# Patient Record
Sex: MALE | Race: WHITE | Employment: FULL TIME | URBAN - METROPOLITAN AREA
[De-identification: names, ages, dates, MRNs, and addresses within clinical notes are randomized per-mention and may not be internally consistent; named-entity substitution may affect disease eponyms.]

---

## 2024-04-10 ENCOUNTER — NURSE ONLY (OUTPATIENT)
Age: 39
End: 2024-04-10

## 2024-04-10 DIAGNOSIS — Z00.00 BLOOD TESTS FOR ROUTINE GENERAL PHYSICAL EXAMINATION: Primary | ICD-10-CM

## 2024-04-11 LAB
ALBUMIN SERPL-MCNC: 4.6 G/DL (ref 4.1–5.1)
ALBUMIN/GLOB SERPL: 2 {RATIO} (ref 1.2–2.2)
ALP SERPL-CCNC: 100 IU/L (ref 44–121)
ALT SERPL-CCNC: 39 IU/L (ref 0–44)
AST SERPL-CCNC: 28 IU/L (ref 0–40)
BASOPHILS # BLD AUTO: 0.1 X10E3/UL (ref 0–0.2)
BASOPHILS NFR BLD AUTO: 1 %
BILIRUB SERPL-MCNC: 0.2 MG/DL (ref 0–1.2)
BUN SERPL-MCNC: 20 MG/DL (ref 6–24)
BUN/CREAT SERPL: 20 (ref 9–20)
CALCIUM SERPL-MCNC: 9.5 MG/DL (ref 8.7–10.2)
CHLORIDE SERPL-SCNC: 105 MMOL/L (ref 96–106)
CHOLEST SERPL-MCNC: 196 MG/DL (ref 100–199)
CO2 SERPL-SCNC: 20 MMOL/L (ref 20–29)
CREAT SERPL-MCNC: 1.02 MG/DL (ref 0.76–1.27)
EGFRCR SERPLBLD CKD-EPI 2021: 95 ML/MIN/1.73
EOSINOPHIL # BLD AUTO: 0.1 X10E3/UL (ref 0–0.4)
EOSINOPHIL NFR BLD AUTO: 1 %
ERYTHROCYTE [DISTWIDTH] IN BLOOD BY AUTOMATED COUNT: 12.1 % (ref 11.6–15.4)
GLOBULIN SER CALC-MCNC: 2.3 G/DL (ref 1.5–4.5)
GLUCOSE SERPL-MCNC: 96 MG/DL (ref 70–99)
HBA1C MFR BLD: 5.5 % (ref 4.8–5.6)
HCT VFR BLD AUTO: 45.4 % (ref 37.5–51)
HDLC SERPL-MCNC: 60 MG/DL
HGB BLD-MCNC: 14.8 G/DL (ref 13–17.7)
IMM GRANULOCYTES # BLD AUTO: 0.1 X10E3/UL (ref 0–0.1)
IMM GRANULOCYTES NFR BLD AUTO: 1 %
LDLC SERPL CALC-MCNC: 124 MG/DL (ref 0–99)
LYMPHOCYTES # BLD AUTO: 2.5 X10E3/UL (ref 0.7–3.1)
LYMPHOCYTES NFR BLD AUTO: 27 %
MCH RBC QN AUTO: 29.5 PG (ref 26.6–33)
MCHC RBC AUTO-ENTMCNC: 32.6 G/DL (ref 31.5–35.7)
MCV RBC AUTO: 91 FL (ref 79–97)
MONOCYTES # BLD AUTO: 0.7 X10E3/UL (ref 0.1–0.9)
MONOCYTES NFR BLD AUTO: 7 %
NEUTROPHILS # BLD AUTO: 5.9 X10E3/UL (ref 1.4–7)
NEUTROPHILS NFR BLD AUTO: 63 %
PLATELET # BLD AUTO: 276 X10E3/UL (ref 150–450)
POTASSIUM SERPL-SCNC: 4.7 MMOL/L (ref 3.5–5.2)
PROT SERPL-MCNC: 6.9 G/DL (ref 6–8.5)
PSA SERPL-MCNC: 0.7 NG/ML (ref 0–4)
RBC # BLD AUTO: 5.01 X10E6/UL (ref 4.14–5.8)
SODIUM SERPL-SCNC: 140 MMOL/L (ref 134–144)
TRIGL SERPL-MCNC: 68 MG/DL (ref 0–149)
TSH SERPL DL<=0.005 MIU/L-ACNC: 1.86 UIU/ML (ref 0.45–4.5)
VLDLC SERPL CALC-MCNC: 12 MG/DL (ref 5–40)
WBC # BLD AUTO: 9.4 X10E3/UL (ref 3.4–10.8)

## 2024-04-14 LAB
TESTOST FREE SERPL-MCNC: 8.7 PG/ML (ref 6.8–21.5)
TESTOST SERPL-MCNC: 339 NG/DL (ref 264–916)

## 2024-04-17 ENCOUNTER — OFFICE VISIT (OUTPATIENT)
Age: 39
End: 2024-04-17

## 2024-04-17 VITALS
DIASTOLIC BLOOD PRESSURE: 82 MMHG | SYSTOLIC BLOOD PRESSURE: 118 MMHG | HEART RATE: 84 BPM | RESPIRATION RATE: 19 BRPM | OXYGEN SATURATION: 94 % | WEIGHT: 205.3 LBS | HEIGHT: 68 IN | TEMPERATURE: 98 F | BODY MASS INDEX: 31.11 KG/M2

## 2024-04-17 DIAGNOSIS — Z00.00 ROUTINE CHECK-UP: Primary | ICD-10-CM

## 2024-04-17 ASSESSMENT — PATIENT HEALTH QUESTIONNAIRE - PHQ9
SUM OF ALL RESPONSES TO PHQ QUESTIONS 1-9: 0
2. FEELING DOWN, DEPRESSED OR HOPELESS: NOT AT ALL
SUM OF ALL RESPONSES TO PHQ9 QUESTIONS 1 & 2: 0
SUM OF ALL RESPONSES TO PHQ QUESTIONS 1-9: 0

## 2024-04-17 NOTE — PROGRESS NOTES
2024    Mynor Carlos (:  1985) is a 39 y.o. male, here for a preventive medicine evaluation.    Subjective   There is no problem list on file for this patient.      Physical was declined at this time.    Vitals:    24 0705   BP: 118/82   Site: Right Upper Arm   Position: Sitting   Cuff Size: Medium Adult   Pulse: 84   Resp: 19   Temp: 98 °F (36.7 °C)   TempSrc: Oral   SpO2: 94%   Weight: 93.1 kg (205 lb 4.8 oz)   Height: 1.734 m (5' 8.25\")     Estimated body mass index is 30.99 kg/m² as calculated from the following:    Height as of this encounter: 1.734 m (5' 8.25\").    Weight as of this encounter: 93.1 kg (205 lb 4.8 oz).       Objective           Latest Ref Rng & Units 4/10/2024     7:18 AM   LAB PRIMARY CARE   A1C 4.8 - 5.6 % 5.5    A1C POC 4.8 - 5.6 % 5.5    GLU random 70 - 99 mg/dL 96    CHOL 100 - 199 mg/dL 196    TRIG 0 - 149 mg/dL 68    HDL >39 mg/dL 60    LDL CALC 0 - 99 mg/dL 124     - 144 mmol/L 140    K 3.5 - 5.2 mmol/L 4.7    BUN 6 - 24 mg/dL 20    CR 0.76 - 1.27 mg/dL 1.02    CA 8.7 - 10.2 mg/dL 9.5    ALT 0 - 44 IU/L 39    AST 0 - 40 IU/L 28    TSH 0.450 - 4.500 uIU/mL 1.860    PSA 0.0 - 4.0 ng/mL 0.7    HGB 13.0 - 17.7 g/dL 14.8        Lab Results   Component Value Date/Time    CHOL 196 04/10/2024 07:18 AM    TRIG 68 04/10/2024 07:18 AM    HDL 60 04/10/2024 07:18 AM    LDLCALC 124 04/10/2024 07:18 AM    GLUCOSE 96 04/10/2024 07:18 AM    LABA1C 5.5 04/10/2024 07:18 AM       The 10-year ASCVD risk score (Ivelisse KNAPP, et al., 2019) is: 0.8%    Values used to calculate the score:      Age: 39 years      Sex: Male      Is Non- : No      Diabetic: No      Tobacco smoker: No      Systolic Blood Pressure: 118 mmHg      Is BP treated: No      HDL Cholesterol: 60 mg/dL      Total Cholesterol: 196 mg/dL      There is no immunization history on file for this patient.    Health Maintenance   Topic Date Due    Hepatitis B vaccine (1 of 3 - 3-dose series)

## 2025-03-31 ENCOUNTER — OFFICE VISIT (OUTPATIENT)
Age: 40
End: 2025-03-31

## 2025-03-31 VITALS
BODY MASS INDEX: 31.07 KG/M2 | WEIGHT: 205 LBS | SYSTOLIC BLOOD PRESSURE: 118 MMHG | HEIGHT: 68 IN | RESPIRATION RATE: 16 BRPM | HEART RATE: 79 BPM | TEMPERATURE: 98 F | DIASTOLIC BLOOD PRESSURE: 86 MMHG | OXYGEN SATURATION: 98 %

## 2025-03-31 DIAGNOSIS — Z75.8 DOES NOT HAVE PRIMARY CARE PROVIDER: ICD-10-CM

## 2025-03-31 DIAGNOSIS — M77.9 TENDONITIS: Primary | ICD-10-CM

## 2025-03-31 NOTE — PROGRESS NOTES
PROGRESS NOTE    SUBJECTIVE:   Mynor Carlos is a 40 y.o. male seen in the [email or web address] for left arm pain. The pain started 2 weeks ago on a Thursday. The patient notes that he was using his left arm to pull toward him the object to support it while using a drill with the right arm. The patient is right hand dominant. He has also been assisting his father build a deck and notes that pain is increased after repetitive activity. Cough intensifies the pain when is is currently present, denies chest pain. Pain with activity is 6-7/10, while at rest pain is 0. He feels that the pain is affecting his  with decreased strength to the left hand. He has been taking ibuprofen which alleviates the pain. He state's that he is concerned that the \"pain is a cardiac event\". No family history of cardiac event or death under the age of 50. The patient does not currently have a PCP, would like to establish care in the future.     Chief Complaint    Pain           Muscle Pain  This is a new problem. The current episode started 1 to 4 weeks ago (2 weeks ago). The problem occurs daily. The problem has been waxing and waning since onset. Associated with: Recent repetitive activity at work, assisting father build a deck. The pain is present in the left arm and left elbow. The pain is mild. Exacerbated by: certain movements. Pertinent negatives include no chest pain, joint swelling, stiffness, shortness of breath or weakness. Past treatments include OTC NSAID. The treatment provided moderate relief. There is no swelling present.       No current outpatient medications on file.     No current facility-administered medications for this visit.      Allergies   Allergen Reactions    Seasonal        Social History     Tobacco Use    Smoking status: Never    Smokeless tobacco: Never        Review of Systems   Constitutional: Negative.    Respiratory:  Negative for shortness of breath.

## 2025-04-01 ASSESSMENT — ENCOUNTER SYMPTOMS: SHORTNESS OF BREATH: 0

## 2025-05-04 SDOH — HEALTH STABILITY: PHYSICAL HEALTH: ON AVERAGE, HOW MANY MINUTES DO YOU ENGAGE IN EXERCISE AT THIS LEVEL?: 60 MIN

## 2025-05-04 SDOH — HEALTH STABILITY: PHYSICAL HEALTH: ON AVERAGE, HOW MANY DAYS PER WEEK DO YOU ENGAGE IN MODERATE TO STRENUOUS EXERCISE (LIKE A BRISK WALK)?: 3 DAYS

## 2025-05-07 ENCOUNTER — OFFICE VISIT (OUTPATIENT)
Dept: PRIMARY CARE CLINIC | Facility: CLINIC | Age: 40
End: 2025-05-07
Payer: COMMERCIAL

## 2025-05-07 VITALS
SYSTOLIC BLOOD PRESSURE: 113 MMHG | HEIGHT: 68 IN | OXYGEN SATURATION: 98 % | BODY MASS INDEX: 30.96 KG/M2 | WEIGHT: 204.3 LBS | DIASTOLIC BLOOD PRESSURE: 82 MMHG | TEMPERATURE: 98 F | HEART RATE: 74 BPM

## 2025-05-07 DIAGNOSIS — Z00.00 ENCOUNTER FOR MEDICAL EXAMINATION TO ESTABLISH CARE: Primary | ICD-10-CM
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
## 2025-05-07 DIAGNOSIS — S70.12XA TRAUMATIC ECCHYMOSIS OF LEFT THIGH, INITIAL ENCOUNTER: ICD-10-CM

## 2025-05-07 PROCEDURE — 99203 OFFICE O/P NEW LOW 30 MIN: CPT | Performed by: FAMILY MEDICINE

## 2025-05-07 SDOH — ECONOMIC STABILITY: FOOD INSECURITY: WITHIN THE PAST 12 MONTHS, THE FOOD YOU BOUGHT JUST DIDN'T LAST AND YOU DIDN'T HAVE MONEY TO GET MORE.: NEVER TRUE

## 2025-05-07 SDOH — ECONOMIC STABILITY: FOOD INSECURITY: WITHIN THE PAST 12 MONTHS, YOU WORRIED THAT YOUR FOOD WOULD RUN OUT BEFORE YOU GOT MONEY TO BUY MORE.: NEVER TRUE

## 2025-05-07 ASSESSMENT — PATIENT HEALTH QUESTIONNAIRE - PHQ9
SUM OF ALL RESPONSES TO PHQ QUESTIONS 1-9: 0
1. LITTLE INTEREST OR PLEASURE IN DOING THINGS: NOT AT ALL
SUM OF ALL RESPONSES TO PHQ QUESTIONS 1-9: 0
2. FEELING DOWN, DEPRESSED OR HOPELESS: NOT AT ALL

## 2025-05-07 NOTE — PROGRESS NOTES
Mynor Carlos (: 1985) presents today c/o    Chief Complaint   Patient presents with    New Patient     Pt presents today to establish care.     Other     Pt would like pcp to know he did have a recent fall - he is building a deck at his home and fell on the joist. He has a bruise on the inner left thigh. No injuries.        40-year-old male presented today to get established medical care in our office.  He works for [employer] and had his physical done every year last done April last year he is planning to schedule I for this year according to patient his all labs were normal.  He have no chronic health condition takes no medicine.  He has no concern or question however his wife requested to examine his inner thigh as he slipped and fell few days ago while building the deck and straddle over the lumbar bar , inner thigh rubbed against the wood bar.  He had few abrasion and scratches on his arms which all bled initially but now scabbed off.  His last tetanus shot was about 5 to 7 years ago.  He had no concerns or questions today           Reviewed and updated this visit by provider:  Tobacco  Allergies  Meds  Problems  Med Hx  Surg Hx  Fam Hx         Review of Systems   All other systems reviewed and are negative.       Visit Vitals  /82 (BP Site: Left Upper Arm, Patient Position: Sitting)   Pulse 74   Temp 98 °F (36.7 °C) (Temporal)   Ht 1.727 m (5' 8\")   Wt 92.7 kg (204 lb 4.8 oz)   SpO2 98%   BMI 31.06 kg/m²       Physical Exam  Vitals and nursing note reviewed.   Constitutional:       General: He is not in acute distress.     Appearance: Normal appearance. He is not ill-appearing.   HENT:      Head: Atraumatic.      Right Ear: Tympanic membrane normal.      Left Ear: Tympanic membrane normal.      Nose: Nose normal.      Mouth/Throat:      Mouth: Mucous membranes are moist.   Eyes:      General: No scleral icterus.  Cardiovascular:      Rate and Rhythm: Normal rate and regular rhythm.